# Patient Record
Sex: MALE | Employment: FULL TIME | URBAN - METROPOLITAN AREA
[De-identification: names, ages, dates, MRNs, and addresses within clinical notes are randomized per-mention and may not be internally consistent; named-entity substitution may affect disease eponyms.]

---

## 2019-04-18 ENCOUNTER — HOSPITAL ENCOUNTER (EMERGENCY)
Facility: CLINIC | Age: 39
Discharge: HOME | End: 2019-04-18
Attending: EMERGENCY MEDICINE | Admitting: EMERGENCY MEDICINE
Payer: OTHER MISCELLANEOUS

## 2019-04-18 VITALS
TEMPERATURE: 98.7 F | SYSTOLIC BLOOD PRESSURE: 109 MMHG | OXYGEN SATURATION: 96 % | DIASTOLIC BLOOD PRESSURE: 74 MMHG | HEART RATE: 96 BPM | RESPIRATION RATE: 20 BRPM

## 2019-04-18 DIAGNOSIS — S61.319A LACERATION OF NAIL BED OF FINGER, INITIAL ENCOUNTER: ICD-10-CM

## 2019-04-18 DIAGNOSIS — S61.112A LACERATION OF LEFT THUMB WITHOUT FOREIGN BODY WITH DAMAGE TO NAIL, INITIAL ENCOUNTER: ICD-10-CM

## 2019-04-18 PROCEDURE — 99283 EMERGENCY DEPT VISIT LOW MDM: CPT

## 2019-04-18 PROCEDURE — 12001 RPR S/N/AX/GEN/TRNK 2.5CM/<: CPT

## 2019-04-18 PROCEDURE — 25000128 H RX IP 250 OP 636: Performed by: EMERGENCY MEDICINE

## 2019-04-18 PROCEDURE — 90471 IMMUNIZATION ADMIN: CPT

## 2019-04-18 PROCEDURE — 90715 TDAP VACCINE 7 YRS/> IM: CPT | Performed by: EMERGENCY MEDICINE

## 2019-04-18 RX ORDER — CEPHALEXIN 500 MG/1
500 CAPSULE ORAL 4 TIMES DAILY
Qty: 28 CAPSULE | Refills: 0 | Status: SHIPPED | OUTPATIENT
Start: 2019-04-18 | End: 2019-04-29

## 2019-04-18 RX ORDER — LIDOCAINE HYDROCHLORIDE AND EPINEPHRINE 10; 10 MG/ML; UG/ML
INJECTION, SOLUTION INFILTRATION; PERINEURAL
Status: DISCONTINUED
Start: 2019-04-18 | End: 2019-04-18 | Stop reason: HOSPADM

## 2019-04-18 RX ORDER — BUPIVACAINE HYDROCHLORIDE 5 MG/ML
INJECTION, SOLUTION PERINEURAL
Status: DISCONTINUED
Start: 2019-04-18 | End: 2019-04-18 | Stop reason: HOSPADM

## 2019-04-18 RX ADMIN — CLOSTRIDIUM TETANI TOXOID ANTIGEN (FORMALDEHYDE INACTIVATED), CORYNEBACTERIUM DIPHTHERIAE TOXOID ANTIGEN (FORMALDEHYDE INACTIVATED), BORDETELLA PERTUSSIS TOXOID ANTIGEN (GLUTARALDEHYDE INACTIVATED), BORDETELLA PERTUSSIS FILAMENTOUS HEMAGGLUTININ ANTIGEN (FORMALDEHYDE INACTIVATED), BORDETELLA PERTUSSIS PERTACTIN ANTIGEN, AND BORDETELLA PERTUSSIS FIMBRIAE 2/3 ANTIGEN 0.5 ML: 5; 2; 2.5; 5; 3; 5 INJECTION, SUSPENSION INTRAMUSCULAR at 05:59

## 2019-04-18 ASSESSMENT — ENCOUNTER SYMPTOMS: WOUND: 1

## 2019-04-18 NOTE — ED AVS SNAPSHOT
[facility] Emergency Department  [address]  [facility] [address]  Phone:  [phone]  Fax:  [phone]                                    Ellen Pereyra   MRN: [id number]    Department:  [facility] Emergency Department   Date of Visit:  4/18/2019           After Visit Summary Signature Page    I have received my discharge instructions, and my questions have been answered. I have discussed any challenges I see with this plan with the nurse or doctor.    ..........................................................................................................................................  Patient/Patient Representative Signature      ..........................................................................................................................................  Patient Representative Print Name and Relationship to Patient    ..................................................               ................................................  Date                                   Time    ..........................................................................................................................................  Reviewed by Signature/Title    ...................................................              ..............................................  Date                                               Time          22EPIC Rev 08/18

## 2019-04-18 NOTE — ED PROVIDER NOTES
History     Chief Complaint:  Laceration     HPI   Ellen Pereyra is a 39 year old male who presents for evaluation of a laceration. This morning shortly prior to arrival, the patient accidentally cut the tip of his left thumb with a utility knife, prompting him to seek evaluation in the ED. His tetanus status was last updated in 2013.     Allergies:  NKDA      Medications:    The patient is not currently taking any prescribed medications.      Past Medical History:    The patient denies any relevant past medical history.     Past Surgical History:    History reviewed. No pertinent past surgical history.     Family History:    History reviewed. No pertinent family history.     Social History:  Immunization status:   Tetanus last updated in 2013   Accompanied to ED by:  Friend      Review of Systems   Skin: Positive for wound (left thumb laceration ).   All other systems reviewed and are negative.      Physical Exam   First Vitals:  BP: (!) 163/109  Pulse: 97  Temp: 98.7  F (37.1  C)  Resp: 20  SpO2: 98 %    Physical Exam  Nursing note and vitals reviewed.  Constitutional: Cooperative.   Cardiovascular: Normal rate, regular rhythm. 2+ left radial pulse  Pulmonary/Chest: Effort normal.   Musculoskeletal: Normal range of motion of left thumb.    Neurological: Alert.   Skin: Skin is warm and dry. No rash noted. Laceration from the left thumb pad extending into nailbed essentially bisecting the tip of the thumb  Psychiatric: Normal mood and affect.     Emergency Department Course     Procedures:    Narrative: Procedure: Laceration Repair        LACERATION:  A complex clean laceration.      LOCATION:  Left thumb pad extending to nailbed.       FUNCTION:  Tendon function intact.      ANESTHESIA:  Digital block using 0.5% bupivacaine      PREPARATION:  Irrigation with Normal Saline and Shur Clens      DEBRIDEMENT:  no debridement      CLOSURE:  The thumb pad portion was 3.25 cm repaired with 6 x 4.0 Ethylon sutures. The  nail was removed from the nailbed. The nailbed portion of the laceration measured 1.4 cm and was repaired using 3 x 4.0 Vicryl sutures. The nail was then replaced under the cuticle and sutured into place with 2 x 4.0 Ethylon sutures. There was no complication, and the patient tolerated the procedure well.     Interventions:  0559 Tdap 0.5 mL IM     Emergency Department Course:  Nursing notes and vitals reviewed.  0548: I performed an exam of the patient as documented above.     0615: A laceration repair was performed as outlined in the procedure note above.  The patient tolerated well and there were no complications.     Findings and plan explained to the Patient. Patient discharged home with instructions regarding supportive care, medications, and reasons to return. The importance of close follow-up was reviewed. The patient was prescribed Keflex.     Impression & Plan      Medical Decision Making:  Ellen Pereyra is a 39 year old male who presents with a deep complex laceration to his thumb involving the nailbed requiring repair. Please see procedure note for details. His tetanus shot was updated. He will be discharged home with appropriate follow up with primary care recommended in 5 days for wound check and monitoring of his nail.     Diagnosis:    ICD-10-CM   1. Laceration of left thumb without foreign body with damage to nail, initial encounter S61.112A   2. Laceration of nail bed of finger, initial encounter, left thumb S61.319A       Disposition:  Discharged to home.     Discharge Medications:  cephALEXin 500 MG capsule  Commonly known as:  KEFLEX  500 mg, Oral, 4 TIMES DAILY              Heraclio BAIN am serving as a scribe at 5:48 AM on 4/18/2019 to document services personally performed by Dr. Frazier, based on my observations and the provider's statements to me.    [facility] EMERGENCY DEPARTMENT       Jovani Frazier MD  04/18/19 0700

## 2019-04-18 NOTE — LETTER
April 18, 2019      To Whom It May Concern:      Ellen Pereyra was seen in our Emergency Department today, 04/18/19.  I expect his condition to improve over the next 3 days.  He may return to work when improved without restrictions.    Sincerely,            Ivette Fernandez RN

## 2019-04-22 ENCOUNTER — OFFICE VISIT (OUTPATIENT)
Dept: INTERNAL MEDICINE | Facility: CLINIC | Age: 39
End: 2019-04-22
Payer: OTHER MISCELLANEOUS

## 2019-04-22 VITALS
OXYGEN SATURATION: 96 % | HEART RATE: 102 BPM | WEIGHT: 241.7 LBS | HEIGHT: 65 IN | RESPIRATION RATE: 16 BRPM | TEMPERATURE: 99 F | BODY MASS INDEX: 40.27 KG/M2 | SYSTOLIC BLOOD PRESSURE: 143 MMHG | DIASTOLIC BLOOD PRESSURE: 96 MMHG

## 2019-04-22 DIAGNOSIS — S61.112D LACERATION OF LEFT THUMB WITHOUT FOREIGN BODY WITH DAMAGE TO NAIL, SUBSEQUENT ENCOUNTER: Primary | ICD-10-CM

## 2019-04-22 PROCEDURE — 99213 OFFICE O/P EST LOW 20 MIN: CPT | Performed by: FAMILY MEDICINE

## 2019-04-22 ASSESSMENT — MIFFLIN-ST. JEOR: SCORE: 1948.22

## 2019-04-22 NOTE — PROGRESS NOTES
"  CHIEF COMPLAINT    F/U L thumb laceration.      HISTORY    E R report from 4-18 reviewed.     He has been changing bandage daily. No bleeding or significant drainage. Still quite painful. Unable to  with L hand.    Work injury. DOI 4-.    Tetanus is current.      REVIEW OF SYSTEMS    Unremarkable except as above.      EXAM  BP (!) 143/96 (BP Location: Right arm, Patient Position: Sitting, Cuff Size: Adult Large)   Pulse 102   Temp 99  F (37.2  C) (Oral)   Resp 16   Ht 1.651 m (5' 5\")   Wt 109.6 kg (241 lb 11.2 oz)   SpO2 96%   BMI 40.22 kg/m      L thumb:  Nylon sutures in pad area extending into nail bed.   No swelling, redness, drainage. Quite tender.    Cleaned gently with soap and water, dried.  Vaseline guaze, guaze, Coban.      (S61.112D) Laceration of left thumb without foreign body with damage to nail, subsequent encounter  (primary encounter diagnosis)  Comment:   Healing satisfactory at this time.   It may be 2-3 weeks until much use.    Plan:   Daily dressing changes.  OK to wash with soap and water.  Re check 1 week.    Workability done.      "

## 2019-04-22 NOTE — LETTER
[place] [address]  [facility] [address]  Phone: [phone]      REPORT OF WORK ABILITY    NOTE TO EMPLOYEE: You must promptly provide a copy of this report to your  employer or worker's compensation insurer, and Qualified Rehabilitation Consultant.    Date: 4/22/2019                     Employee Name: Ellen Pereyra         YOB: 1980  Medical Record Number: [id number]   Soc.Sec.No: [id number]  Employer:  [employer]               Date of Injury: 4-  Managed Care Organization / Insurance Company Name: UNKNOWN    Diagnosis:   Complicated laceration left thumb.    Work Related: yes     MMI: NO   Permanent Partial Disability(PPD) likely: NO    EMPLOYEE IS ABLE TO WORK: OFF work from 4-18 to 4-     RESTRICTIONS IF ANY:      OTHER RESTRICTIONS: None    TREATMENT PLAN/NOTES:   Daily bandage change.  Needs time to heal before can resume work.    Recheck 4-29.      Rodo Wise MD on 4/22/2019 at 8:24 AM

## 2019-04-29 ENCOUNTER — OFFICE VISIT (OUTPATIENT)
Dept: INTERNAL MEDICINE | Facility: CLINIC | Age: 39
End: 2019-04-29
Payer: OTHER MISCELLANEOUS

## 2019-04-29 VITALS
HEIGHT: 65 IN | HEART RATE: 94 BPM | WEIGHT: 238.8 LBS | BODY MASS INDEX: 39.79 KG/M2 | RESPIRATION RATE: 16 BRPM | OXYGEN SATURATION: 97 % | DIASTOLIC BLOOD PRESSURE: 78 MMHG | TEMPERATURE: 99.1 F | SYSTOLIC BLOOD PRESSURE: 116 MMHG

## 2019-04-29 DIAGNOSIS — S61.112D LACERATION OF LEFT THUMB WITHOUT FOREIGN BODY WITH DAMAGE TO NAIL, SUBSEQUENT ENCOUNTER: Primary | ICD-10-CM

## 2019-04-29 PROCEDURE — 99213 OFFICE O/P EST LOW 20 MIN: CPT | Performed by: FAMILY MEDICINE

## 2019-04-29 ASSESSMENT — MIFFLIN-ST. JEOR: SCORE: 1935.07

## 2019-04-29 NOTE — PROGRESS NOTES
"  SUBJECTIVE:   Ellen Pereyra is a 39 year old male who presents to clinic today for the following   health issues:      F/U laceration L thumb      HISTORY    Still painful. Has been managing dressings by self. Leaving open to air at times. No drainage.      REVIEW OF SYSTEMS      Unremarkable except as above.      EXAM  /78 (BP Location: Right arm, Patient Position: Sitting, Cuff Size: Adult Large)   Pulse 94   Temp 99.1  F (37.3  C) (Oral)   Resp 16   Ht 1.651 m (5' 5\")   Wt 108.3 kg (238 lb 12.8 oz)   SpO2 97%   BMI 39.74 kg/m      L thumb:  Sutures intact.  No swelling, redness, drainage.  Moderately tender.      (S66.521D) Laceration of left thumb without foreign body with damage to nail, subsequent encounter  (primary encounter diagnosis)  Comment:   Healing satis.  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  Plan:   F/U 1 week.  Workability done.        "

## 2019-04-29 NOTE — LETTER
[place] [address]  [facility] [address]  Phone: [phone]      REPORT OF WORK ABILITY    NOTE TO EMPLOYEE: You must promptly provide a copy of this report to your  employer or worker's compensation insurer, and Qualified Rehabilitation Consultant.    Date: 4/29/2019                     Employee Name: Ellen Pereyra         YOB: 1980  Medical Record Number: [id number]   Soc.Sec.No: [id number]  Employer:  [employer]              Date of Injury: 4-  Managed Care Organization / Insurance Company Name: UNKNOWN    Diagnosis:     Complicated Laceration L Thumb.      Work Related: yes     MMI: NO   Permanent Partial Disability(PPD) likely: NO    EMPLOYEE IS ABLE TO WORK:  Unable to work 4-18 through May 6.     RESTRICTIONS IF ANY:        OTHER RESTRICTIONS: None    TREATMENT PLAN/NOTES:     Sutured. Wound care..      Recheck 5-6-2019    Rodo Wise MD on 4/29/2019 at 8:03 AM

## 2019-05-06 ENCOUNTER — TELEPHONE (OUTPATIENT)
Dept: INTERNAL MEDICINE | Facility: CLINIC | Age: 39
End: 2019-05-06

## 2019-05-06 ENCOUNTER — OFFICE VISIT (OUTPATIENT)
Dept: INTERNAL MEDICINE | Facility: CLINIC | Age: 39
End: 2019-05-06
Payer: OTHER MISCELLANEOUS

## 2019-05-06 VITALS
BODY MASS INDEX: 39.67 KG/M2 | OXYGEN SATURATION: 97 % | HEIGHT: 65 IN | SYSTOLIC BLOOD PRESSURE: 138 MMHG | DIASTOLIC BLOOD PRESSURE: 80 MMHG | WEIGHT: 238.1 LBS | TEMPERATURE: 98.5 F | HEART RATE: 102 BPM

## 2019-05-06 DIAGNOSIS — S61.112D LACERATION OF LEFT THUMB WITHOUT FOREIGN BODY WITH DAMAGE TO NAIL, SUBSEQUENT ENCOUNTER: Primary | ICD-10-CM

## 2019-05-06 PROCEDURE — 99213 OFFICE O/P EST LOW 20 MIN: CPT | Performed by: FAMILY MEDICINE

## 2019-05-06 ASSESSMENT — MIFFLIN-ST. JEOR: SCORE: 1931.89

## 2019-05-06 NOTE — TELEPHONE ENCOUNTER
Hardeep calling from [employer] ( patients employer) [phone]. Hardeep asking if Ellen can come to work on light duty instead of being off until 5/20/19. Hardeep states there is some one handed duties for Michelledoretha to do.

## 2019-05-06 NOTE — PROGRESS NOTES
"  CHIEF COMPLAINT    F/U complicated L thumb laceration.      HISTORY    DOI was 4-18. Thumb still painful. Unable to  or pinch. He removed 2 sutures from nail. One is unable to pull out after cut.      EXAM  /80   Pulse 102   Temp 98.5  F (36.9  C) (Oral)   Ht 1.651 m (5' 5\")   Wt 108 kg (238 lb 1.6 oz)   SpO2 97%   BMI 39.62 kg/m      L thumb:  Mild swelling. No redness or drainage.  Suture line intact. Every other suture removed.    Re bandaged.      (A74.393J) Laceration of left thumb without foreign body with damage to nail, subsequent encounter  (primary encounter diagnosis)  Comment:   Healing satis.  I believe the remaining suture fragment will pull through as nail grows.  Plan:   Allow additional healing.  Re check in 2 weeks.  Workability done.      "

## 2019-05-14 NOTE — TELEPHONE ENCOUNTER
Please see note below.  Patient calling to check status.  Please advise.  Please fax to [phone].

## 2019-05-20 ENCOUNTER — OFFICE VISIT (OUTPATIENT)
Dept: INTERNAL MEDICINE | Facility: CLINIC | Age: 39
End: 2019-05-20
Payer: OTHER MISCELLANEOUS

## 2019-05-20 VITALS
SYSTOLIC BLOOD PRESSURE: 123 MMHG | WEIGHT: 241.1 LBS | OXYGEN SATURATION: 99 % | BODY MASS INDEX: 40.17 KG/M2 | HEIGHT: 65 IN | HEART RATE: 95 BPM | RESPIRATION RATE: 18 BRPM | TEMPERATURE: 98.5 F | DIASTOLIC BLOOD PRESSURE: 77 MMHG

## 2019-05-20 DIAGNOSIS — S61.112D LACERATION OF LEFT THUMB WITHOUT FOREIGN BODY WITH DAMAGE TO NAIL, SUBSEQUENT ENCOUNTER: Primary | ICD-10-CM

## 2019-05-20 PROCEDURE — 99213 OFFICE O/P EST LOW 20 MIN: CPT | Performed by: FAMILY MEDICINE

## 2019-05-20 ASSESSMENT — MIFFLIN-ST. JEOR: SCORE: 1945.5

## 2019-05-20 NOTE — PATIENT INSTRUCTIONS
Soak hand in warm water 2 or 3 times daily.    Take prescribed medication as directed.    Re check in 3 days.

## 2019-05-20 NOTE — PROGRESS NOTES
"  CHIEF COMPLAINT    Follow-up left thumb laceration.      HISTORY    Date of injury was April 18.  He has some additional    Swelling just proximal to the nail at this time and he has persistent tenderness.    He attempted to return to work last week doing one-handed work with right hand only but was unable to continue beyond an initial attempt on May 13.  It sounds like some material was expressed from the area just at the base of the nail and he has tenderness on the volar aspect thumb beyond the IP joint.      There is no problem list on file for this patient.      REVIEW OF SYSTEMS    Unremarkable except as above.      EXAM  /77 (BP Location: Right arm, Patient Position: Sitting, Cuff Size: Adult Large)   Pulse 95   Temp 98.5  F (36.9  C) (Oral)   Resp 18   Ht 1.651 m (5' 5\")   Wt 109.4 kg (241 lb 1.6 oz)   SpO2 99%   BMI 40.12 kg/m      Left thumb:  Mild swelling without redness or obvious purulent drainage.  There is a suture fragment remaining through the nail.  There is persistent tenderness of the thumb distal to IP joint.      (S62.909N) Laceration of left thumb without foreign body with damage to nail, subsequent encounter  (primary encounter diagnosis)  Comment:     Persistent pain and mild swelling.  Possible low-grade infection.  Somewhat slow to heal.    Plan: amoxicillin-clavulanate (AUGMENTIN) 875-125 MG         tablet        We will keep him off work this week.  Start antibiotic, soaks.  Recheck in 3 days and we plan to see him again also on May 27.  Workability completed.      "

## 2019-05-23 ENCOUNTER — OFFICE VISIT (OUTPATIENT)
Dept: INTERNAL MEDICINE | Facility: CLINIC | Age: 39
End: 2019-05-23
Payer: OTHER MISCELLANEOUS

## 2019-05-23 VITALS
WEIGHT: 241 LBS | TEMPERATURE: 97.9 F | DIASTOLIC BLOOD PRESSURE: 72 MMHG | HEART RATE: 91 BPM | HEIGHT: 65 IN | OXYGEN SATURATION: 97 % | SYSTOLIC BLOOD PRESSURE: 126 MMHG | RESPIRATION RATE: 17 BRPM | BODY MASS INDEX: 40.15 KG/M2

## 2019-05-23 DIAGNOSIS — S61.112D LACERATION OF LEFT THUMB WITHOUT FOREIGN BODY WITH DAMAGE TO NAIL, SUBSEQUENT ENCOUNTER: Primary | ICD-10-CM

## 2019-05-23 PROCEDURE — 99212 OFFICE O/P EST SF 10 MIN: CPT | Performed by: FAMILY MEDICINE

## 2019-05-23 ASSESSMENT — MIFFLIN-ST. JEOR: SCORE: 1945.05

## 2019-05-23 NOTE — PROGRESS NOTES
"  CHIEF COMPLAINT    F/U L thumb laceration      HISTORY    This patient had a complicated distal left thumb laceration April 18.  He was last seen in the office on May 20 with some additional swelling and tenderness and possibly a bit of drainage.  He was having difficulty flexing his left thumb at the IPJ.    He was placed on Augmentin and soaks.    Today he reports that he has less tenderness and more movement so he basically feels much improved.      There is no problem list on file for this patient.      REVIEW OF SYSTEMS    Unremarkable except as above.      EXAM  /72   Pulse 91   Temp 97.9  F (36.6  C) (Oral)   Resp 17   Ht 1.651 m (5' 5\")   Wt 109.3 kg (241 lb)   SpO2 97%   BMI 40.10 kg/m      Left thumb:  Decreased swelling.  At least 45 degrees of flexion at the IPJ.  Minimally tender.      (R07.143X) Laceration of left thumb without foreign body with damage to nail, subsequent encounter  (primary encounter diagnosis)  Comment: improved  Plan:   Continue antibiotic, soaks.  Re check May 28.      "

## 2019-05-28 ENCOUNTER — OFFICE VISIT (OUTPATIENT)
Dept: INTERNAL MEDICINE | Facility: CLINIC | Age: 39
End: 2019-05-28
Payer: OTHER MISCELLANEOUS

## 2019-05-28 VITALS
SYSTOLIC BLOOD PRESSURE: 118 MMHG | RESPIRATION RATE: 16 BRPM | HEIGHT: 65 IN | BODY MASS INDEX: 39.69 KG/M2 | TEMPERATURE: 98.8 F | WEIGHT: 238.2 LBS | DIASTOLIC BLOOD PRESSURE: 75 MMHG | HEART RATE: 82 BPM | OXYGEN SATURATION: 99 %

## 2019-05-28 DIAGNOSIS — S61.112D LACERATION OF LEFT THUMB WITHOUT FOREIGN BODY WITH DAMAGE TO NAIL, SUBSEQUENT ENCOUNTER: Primary | ICD-10-CM

## 2019-05-28 PROCEDURE — 99213 OFFICE O/P EST LOW 20 MIN: CPT | Performed by: FAMILY MEDICINE

## 2019-05-28 ASSESSMENT — MIFFLIN-ST. JEOR: SCORE: 1932.35

## 2019-05-28 NOTE — PROGRESS NOTES
"  CHIEF COMPLAINT    Follow-up complicated left thumb laceration.      HISTORY    Date of injury was April 18.  The laceration went over the tip of the thumb and down onto the nail.  It has been somewhat slow to heal.  Healing also complicated by recent infection which has improved.  He still has some tenderness.      There is no problem list on file for this patient.      EXAM  /75 (BP Location: Left arm, Patient Position: Sitting, Cuff Size: Adult Large)   Pulse 82   Temp 98.8  F (37.1  C) (Oral)   Resp 16   Ht 1.651 m (5' 5\")   Wt 108 kg (238 lb 3.2 oz)   SpO2 99%   BMI 39.64 kg/m        Left thumb:  Laceration is healing although gapping slightly due to not completely healing [id number].  He still has tenderness over the tip and pad area of the thumb.    No swelling, redness, purulent drainage.      (W25.665S) Laceration of left thumb without foreign body with damage to nail, subsequent encounter  (primary encounter diagnosis)  Comment:     Significantly improved at this time and seems to be healing okay.    Plan:     At this time patient is advised to return to work with one hand work only, that being his right hand.  He was advised to cover the wound area on his left thumb with Band-Aids while he is working.  Follow-up is advised June 24.  Workability completed.   with bandages    "

## 2019-05-28 NOTE — LETTER
[place] [address]  [facility] [address]  Phone: [phone]      REPORT OF WORK ABILITY    NOTE TO EMPLOYEE: You must promptly provide a copy of this report to your  employer or worker's compensation insurer, and Qualified Rehabilitation Consultant.    Date: 5/28/2019                     Employee Name: Ellen Pereyra         YOB: 1980  Medical Record Number: [id number]   Soc.Sec.No: [id number]  Employer:   [employer]               Date of Injury:  4-  Managed Care Organization / Insurance Company Name: UNKNOWN    Diagnosis:     (S61.112D) Laceration of left thumb without foreign body with damage to nail, subsequent encounter  (primary encounter diagnosis)      Work Related: yes     MMI: NO   Permanent Partial Disability(PPD) likely: NO    EMPLOYEE IS ABLE TO WORK: with restrictions from  5-  to 6-.     RESTRICTIONS IF ANY:    Unable to use left hand ( right hand work only )    OTHER RESTRICTIONS: None    TREATMENT PLAN/NOTES: sutures, wound care, antibiotic.      Rood Wise MD on 5/28/2019 at 11:36 AM

## 2019-06-04 ENCOUNTER — TELEPHONE (OUTPATIENT)
Dept: INTERNAL MEDICINE | Facility: CLINIC | Age: 39
End: 2019-06-04

## 2019-06-04 NOTE — TELEPHONE ENCOUNTER
Fax received from [employer] for review and signature.  We do not have an JUANIS from patient in order to complete.  Left voicemail for patient to return call to advise that an JUANIS is needed.  Form is under my wire basket on my desk until patient calls back.

## 2019-06-24 ENCOUNTER — OFFICE VISIT (OUTPATIENT)
Dept: INTERNAL MEDICINE | Facility: CLINIC | Age: 39
End: 2019-06-24
Payer: OTHER MISCELLANEOUS

## 2019-06-24 VITALS
HEIGHT: 65 IN | BODY MASS INDEX: 39.95 KG/M2 | DIASTOLIC BLOOD PRESSURE: 83 MMHG | HEART RATE: 91 BPM | WEIGHT: 239.8 LBS | OXYGEN SATURATION: 100 % | TEMPERATURE: 98.7 F | SYSTOLIC BLOOD PRESSURE: 119 MMHG

## 2019-06-24 DIAGNOSIS — S61.112D LACERATION OF LEFT THUMB WITHOUT FOREIGN BODY WITH DAMAGE TO NAIL, SUBSEQUENT ENCOUNTER: Primary | ICD-10-CM

## 2019-06-24 PROCEDURE — 99213 OFFICE O/P EST LOW 20 MIN: CPT | Performed by: FAMILY MEDICINE

## 2019-06-24 ASSESSMENT — MIFFLIN-ST. JEOR: SCORE: 1939.61

## 2019-06-24 NOTE — PROGRESS NOTES
"  CHIEF COMPLAINT    F/U left thumb laceration.    DOI 4-      HISTORY    This patient had a laceration at the distal portion of the left thumb on the day indicated.  The cut went down through the nail and over the top of the finger.  He has had some prolonged tenderness and also at one time he had some infection which improved.    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  He still has some tenderness in the distal portion of his thumb.      There is no problem list on file for this patient.      REVIEW OF SYSTEMS    Unremarkable except as above.      History reviewed. No pertinent past medical history.      EXAM  /83 (BP Location: Left arm, Patient Position: Sitting, Cuff Size: Adult Large)   Pulse 91   Temp 98.7  F (37.1  C) (Oral)   Ht 1.651 m (5' 5\")   Wt 108.8 kg (239 lb 12.8 oz)   SpO2 100%   BMI 39.90 kg/m      Left thumb:  Distal portion of nail is partly detached.  He has tenderness at the distal and ulnar aspect of his thumb.  Laceration is well-healed.  There is no sign of purulent drainage or significant swelling.    (W90.477L) Laceration of left thumb without foreign body with damage to nail, subsequent encounter  (primary encounter diagnosis)    Comment:   Continued progress in healing.  There is some remaining tenderness but I suspect this will resolve over the next month or so.    Plan:   He was asked to trim up the nail and protected the thumb as much as possible.  He can return to unrestricted work at this point.  Workability was completed.        "

## 2019-06-24 NOTE — LETTER
[place] [address]  [facility] [address]  Phone: [phone]      REPORT OF WORK ABILITY    NOTE TO EMPLOYEE: You must promptly provide a copy of this report to your  employer or worker's compensation insurer, and Qualified Rehabilitation Consultant.    Date: 6/24/2019                     Employee Name: Ellen Pereyra         YOB: 1980  Medical Record Number: [id number]   Soc.Sec.No: [id number]  Employer:   [employer]              Date of Injury: 4-18-19  Managed Care Organization / Insurance Company Name: UNKNOWN    Diagnosis:   (S61.112D) Laceration of left thumb without foreign body with damage to nail, subsequent encounter  (primary encounter diagnosis)    Work Related: yes     MMI: NO   Permanent Partial Disability(PPD) likely: NO    EMPLOYEE IS ABLE TO WORK: unrestricted as of 6-24-19     RESTRICTIONS IF ANY:    none    OTHER RESTRICTIONS: None    TREATMENT PLAN/NOTES: protect area..      Rodo Wise MD on 6/24/2019 at 8:08 AM

## 2019-11-14 NOTE — LETTER
: Chyna was seen in X-ray today for a lumbar epidural injection. Patient rated pain before procedure 5/10. After procedure patient left before giving pain score. This pain level is acceptable to patient. Patient discharged home with .   [place] [address]  [facility] [address]  Phone: [phone]      REPORT OF WORK ABILITY    NOTE TO EMPLOYEE: You must promptly provide a copy of this report to your  employer or worker's compensation insurer, and Qualified Rehabilitation Consultant.    Date: 5/20/2019                     Employee Name: Ellen Pereyra         YOB: 1980  Medical Record Number: [id number]   Soc.Sec.No: [id number]  Employer: None                Date of Injury: 4-  Managed Care Organization / Insurance Company Name: UNKNOWN    Diagnosis:     (S61.112D) Laceration of left thumb without foreign body with damage to nail, subsequent encounter  (primary encounter diagnosis)  Comment:   Plan: amoxicillin-clavulanate (AUGMENTIN) 875-125 MG         tablet              Work Related: yes     MMI: NO   Permanent Partial Disability(PPD) likely: NO    EMPLOYEE IS ABLE TO WORK: OFF work from 5-20  to 5-    Re check: 5-23, 5-27.     RESTRICTIONS IF ANY:    OTHER RESTRICTIONS: None    TREATMENT PLAN/NOTES:     Sutured.   Antibiotic. Soaks..      Rodo Wise MD on 5/20/2019 at 8:03 AM